# Patient Record
Sex: MALE | URBAN - METROPOLITAN AREA
[De-identification: names, ages, dates, MRNs, and addresses within clinical notes are randomized per-mention and may not be internally consistent; named-entity substitution may affect disease eponyms.]

---

## 2024-03-11 ENCOUNTER — LAB (OUTPATIENT)
Dept: URBAN - METROPOLITAN AREA CLINIC 86 | Facility: CLINIC | Age: 74
End: 2024-03-11

## 2024-03-11 ENCOUNTER — OV NP (OUTPATIENT)
Dept: URBAN - METROPOLITAN AREA CLINIC 86 | Facility: CLINIC | Age: 74
End: 2024-03-11

## 2024-03-11 PROBLEM — 736693005: Status: ACTIVE | Noted: 2024-03-11

## 2024-03-11 PROBLEM — 128302006: Status: ACTIVE | Noted: 2024-03-11

## 2024-03-11 PROBLEM — 443913008: Status: ACTIVE | Noted: 2024-03-11

## 2024-03-11 PROBLEM — 453861000124107: Status: ACTIVE | Noted: 2024-03-11

## 2024-03-11 NOTE — HPI-TODAY'S VISIT:
72 yo male referred by Dr. Tressa De Leon for evaluation of hepatitis c. A copy of the note will be sent to the referring provider.   The pt is listed taking cyclonbenzaprine, furosemide 20 mg, ibuprofen 600 mg, losartan potassium 25 mg, metoprolol ER 25 mg, past medical history includes hypertension.  Labs on January night show that the hepatitis B surface antigen negative, vitamin D 33.3, creatinine 1.05, bilirubin 0.6, AST 75, ALT 40.  HIV test is negative.  White blood cells 4.5, red blood cells 4.89, hemoglobin 15, platelets 273.  Hepatitis B core negative, hepatitis B surface antibody negative.  Hepatitis C antibody was reactive, hepatitis C viral load 302,000, genotype Ia

## 2024-10-28 ENCOUNTER — LAB OUTSIDE AN ENCOUNTER (OUTPATIENT)
Dept: URBAN - METROPOLITAN AREA CLINIC 86 | Facility: CLINIC | Age: 74
End: 2024-10-28

## 2024-10-28 ENCOUNTER — DASHBOARD ENCOUNTERS (OUTPATIENT)
Age: 74
End: 2024-10-28

## 2024-10-28 ENCOUNTER — OFFICE VISIT (OUTPATIENT)
Dept: URBAN - METROPOLITAN AREA CLINIC 86 | Facility: CLINIC | Age: 74
End: 2024-10-28
Payer: MEDICARE

## 2024-10-28 VITALS
TEMPERATURE: 97.3 F | RESPIRATION RATE: 96 BRPM | BODY MASS INDEX: 23.86 KG/M2 | WEIGHT: 152 LBS | DIASTOLIC BLOOD PRESSURE: 72 MMHG | HEIGHT: 67 IN | SYSTOLIC BLOOD PRESSURE: 105 MMHG | HEART RATE: 62 BPM

## 2024-10-28 DIAGNOSIS — B18.2 CHRONIC HEPATITIS C WITHOUT HEPATIC COMA: ICD-10-CM

## 2024-10-28 DIAGNOSIS — Z78.9 HEPATITIS B CORE ANTIBODY NEGATIVE: ICD-10-CM

## 2024-10-28 DIAGNOSIS — Z71.85 VACCINE COUNSELING: ICD-10-CM

## 2024-10-28 DIAGNOSIS — Z79.899 HIGH RISK MEDICATION USE: ICD-10-CM

## 2024-10-28 PROCEDURE — 99214 OFFICE O/P EST MOD 30 MIN: CPT | Performed by: PHYSICIAN ASSISTANT

## 2024-10-28 RX ORDER — ELECTROLYTES/DEXTROSE
1 TABLET SOLUTION, ORAL ORAL ONCE A DAY
Status: ACTIVE | COMMUNITY

## 2024-10-28 RX ORDER — ACETAMINOPHEN 500 MG/1
1 TABLET AS NEEDED TABLET ORAL
Status: ACTIVE | COMMUNITY

## 2024-10-28 RX ORDER — DAPAGLIFLOZIN 10 MG/1
1 TABLET TABLET, FILM COATED ORAL ONCE A DAY
Status: ACTIVE | COMMUNITY

## 2024-10-28 RX ORDER — GABAPENTIN 300 MG/1
1 CAPSULE CAPSULE ORAL ONCE A DAY
Status: ACTIVE | COMMUNITY

## 2024-10-28 RX ORDER — METOPROLOL TARTRATE 50 MG/1
1 TABLET WITH FOOD TABLET, FILM COATED ORAL TWICE A DAY
Status: ACTIVE | COMMUNITY

## 2024-10-28 RX ORDER — SACUBITRIL AND VALSARTAN 24; 26 MG/1; MG/1
1 TABLET TABLET, FILM COATED ORAL TWICE A DAY
Status: ACTIVE | COMMUNITY

## 2024-10-28 RX ORDER — SPIRONOLACTONE 25 MG/1
1 TABLET TABLET, FILM COATED ORAL
Status: ACTIVE | COMMUNITY

## 2024-10-28 NOTE — HPI-TODAY'S VISIT:
74 yo male referred by Dr. Tressa De Leon for evaluation of hepatitis c. A copy of the note will be sent to the referring provider.   The pt is listed taking cyclonbenzaprine, furosemide 20 mg, ibuprofen 600 mg, losartan potassium 25 mg, metoprolol ER 25 mg, past medical history includes hypertension.  Labs on January night show that the hepatitis B surface antigen negative, vitamin D 33.3, creatinine 1.05, bilirubin 0.6, AST 75, ALT 40.  HIV test is negative.  White blood cells 4.5, red blood cells 4.89, hemoglobin 15, platelets 273. Hepatitis C antibody was reactive, hepatitis C viral load 302,000, genotype Ia  REviewed the above and he was seening someone at [facility] for the hep c and finished epclusa about 2 months ago and therefore need to get the labs and imaging updated  Discussed the above.  Discussed hcc screening.  Need the [facility] records

## 2024-11-14 ENCOUNTER — OFFICE VISIT (OUTPATIENT)
Dept: URBAN - METROPOLITAN AREA CLINIC 91 | Facility: CLINIC | Age: 74
End: 2024-11-14

## 2024-12-18 ENCOUNTER — OFFICE VISIT (OUTPATIENT)
Dept: URBAN - METROPOLITAN AREA CLINIC 86 | Facility: CLINIC | Age: 74
End: 2024-12-18

## 2024-12-18 RX ORDER — ACETAMINOPHEN 500 MG/1
1 TABLET AS NEEDED TABLET ORAL
Status: ACTIVE | COMMUNITY

## 2024-12-18 RX ORDER — DAPAGLIFLOZIN 10 MG/1
1 TABLET TABLET, FILM COATED ORAL ONCE A DAY
Status: ACTIVE | COMMUNITY

## 2024-12-18 RX ORDER — GABAPENTIN 300 MG/1
1 CAPSULE CAPSULE ORAL ONCE A DAY
Status: ACTIVE | COMMUNITY

## 2024-12-18 RX ORDER — SPIRONOLACTONE 25 MG/1
1 TABLET TABLET, FILM COATED ORAL
Status: ACTIVE | COMMUNITY

## 2024-12-18 RX ORDER — METOPROLOL TARTRATE 50 MG/1
1 TABLET WITH FOOD TABLET, FILM COATED ORAL TWICE A DAY
Status: ACTIVE | COMMUNITY

## 2024-12-18 RX ORDER — SACUBITRIL AND VALSARTAN 24; 26 MG/1; MG/1
1 TABLET TABLET, FILM COATED ORAL TWICE A DAY
Status: ACTIVE | COMMUNITY

## 2024-12-18 RX ORDER — ELECTROLYTES/DEXTROSE
1 TABLET SOLUTION, ORAL ORAL ONCE A DAY
Status: ACTIVE | COMMUNITY

## 2024-12-18 NOTE — HPI-TODAY'S VISIT:
72 yo male referred by Dr. Tressa De Leon for evaluation of hepatitis c. A copy of the note will be sent to the referring provider.   The pt is listed taking cyclonbenzaprine, furosemide 20 mg, ibuprofen 600 mg, losartan potassium 25 mg, metoprolol ER 25 mg, past medical history includes hypertension.  Labs on January night show that the hepatitis B surface antigen negative, vitamin D 33.3, creatinine 1.05, bilirubin 0.6, AST 75, ALT 40.  HIV test is negative.  White blood cells 4.5, red blood cells 4.89, hemoglobin 15, platelets 273. Hepatitis C antibody was reactive, hepatitis C viral load 302,000, genotype Ia  REviewed the above and he was seening someone at [facility] for the hep c and finished epclusa about 2 months ago and therefore need to get the labs and imaging updated  Discussed the above.  Discussed hcc screening.  Need the [facility] records